# Patient Record
Sex: FEMALE
[De-identification: names, ages, dates, MRNs, and addresses within clinical notes are randomized per-mention and may not be internally consistent; named-entity substitution may affect disease eponyms.]

---

## 2022-09-06 ENCOUNTER — APPOINTMENT (OUTPATIENT)
Dept: ORTHOPEDIC SURGERY | Facility: CLINIC | Age: 72
End: 2022-09-06

## 2022-09-06 VITALS — BODY MASS INDEX: 25.23 KG/M2 | HEIGHT: 66 IN | WEIGHT: 157 LBS

## 2022-09-06 DIAGNOSIS — S52.531A COLLES' FRACTURE OF RIGHT RADIUS, INITIAL ENCOUNTER FOR CLOSED FRACTURE: ICD-10-CM

## 2022-09-06 PROBLEM — Z00.00 ENCOUNTER FOR PREVENTIVE HEALTH EXAMINATION: Status: ACTIVE | Noted: 2022-09-06

## 2022-09-06 PROCEDURE — 29075 APPL CST ELBW FNGR SHORT ARM: CPT | Mod: RT

## 2022-09-06 PROCEDURE — 99203 OFFICE O/P NEW LOW 30 MIN: CPT | Mod: 57

## 2022-09-06 PROCEDURE — 25600 CLTX DST RDL FX/EPHYS SEP WO: CPT

## 2022-09-07 PROBLEM — S52.531A CLOSED COLLES' FRACTURE OF RIGHT RADIUS, INITIAL ENCOUNTER: Status: ACTIVE | Noted: 2022-09-07

## 2022-09-07 NOTE — PHYSICAL EXAM
[de-identified] : R wrist\par Swelling\par Tender DR\par Limited wrist ROM\par NVI\par \par Xrays nondisplaced DR fracture

## 2022-09-07 NOTE — HISTORY OF PRESENT ILLNESS
[Sudden] : sudden [3] : 3 [2] : 2 [Dull/Aching] : dull/aching [Ice] : ice [de-identified] : [place] wrist injury on [place] cruise 4[place]5 days ago\par She was only xrays 1-2 days ago [] : no [FreeTextEntry1] : right wrist [FreeTextEntry5] : tripped and fell in [place] 4 nights ago. 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

## 2022-09-07 NOTE — ASSESSMENT
[FreeTextEntry1] : I placed her in a short arm cast\par She will follow up in 1 week with home ortho. I explained to her that if fracture displaces, then will need surgery